# Patient Record
Sex: FEMALE | Race: WHITE | NOT HISPANIC OR LATINO | Employment: FULL TIME | URBAN - METROPOLITAN AREA
[De-identification: names, ages, dates, MRNs, and addresses within clinical notes are randomized per-mention and may not be internally consistent; named-entity substitution may affect disease eponyms.]

---

## 2023-05-08 ENCOUNTER — OFFICE VISIT (OUTPATIENT)
Dept: PRIMARY CARE | Facility: CLINIC | Age: 23
End: 2023-05-08
Payer: COMMERCIAL

## 2023-05-08 VITALS
WEIGHT: 205 LBS | HEART RATE: 93 BPM | SYSTOLIC BLOOD PRESSURE: 121 MMHG | DIASTOLIC BLOOD PRESSURE: 82 MMHG | HEIGHT: 63 IN | BODY MASS INDEX: 36.32 KG/M2 | OXYGEN SATURATION: 98 %

## 2023-05-08 DIAGNOSIS — Z00.00 WELL ADULT EXAM: Primary | ICD-10-CM

## 2023-05-08 DIAGNOSIS — E66.9 OBESITY (BMI 35.0-39.9 WITHOUT COMORBIDITY): ICD-10-CM

## 2023-05-08 PROBLEM — F41.1 GAD (GENERALIZED ANXIETY DISORDER): Status: ACTIVE | Noted: 2022-12-05

## 2023-05-08 PROBLEM — F42.9 OBSESSIVE-COMPULSIVE DISORDER: Status: ACTIVE | Noted: 2022-12-05

## 2023-05-08 PROBLEM — F41.0 PANIC ATTACK: Status: ACTIVE | Noted: 2023-05-08

## 2023-05-08 PROCEDURE — 1036F TOBACCO NON-USER: CPT | Performed by: FAMILY MEDICINE

## 2023-05-08 PROCEDURE — 99395 PREV VISIT EST AGE 18-39: CPT | Performed by: FAMILY MEDICINE

## 2023-05-08 RX ORDER — SERTRALINE HYDROCHLORIDE 100 MG/1
200 TABLET, FILM COATED ORAL DAILY
COMMUNITY

## 2023-05-08 RX ORDER — CLONAZEPAM 0.5 MG/1
0.5 TABLET ORAL 2 TIMES DAILY
COMMUNITY

## 2023-05-08 NOTE — PROGRESS NOTES
"  Subjective   Patient ID: Layla Geiger is a 23 y.o. female who presents for Annual Exam (NP to establish care).    Past Medical, Surgical, Social and Family Hx reviewed-Yes    Any acute complaints?    No    Any chronic issues addressed today or Rx refills needed?    No, reviewed meds    Last pap has not had one yet, has appointment in July  Last Mammogram N/A  Colon CA screening Hx N/A  Labs none recently  Up to date on immunizations not sure about TDaP  Dentist in the past year yes    Menstruation no concerns  Sexual Activity no concerns, no need for birth control        PE:  /82 (BP Location: Left arm)   Pulse 93   Ht 1.6 m (5' 3\")   Wt 93 kg (205 lb)   SpO2 98%   BMI 36.31 kg/m²   Alert adult woman, NAD  HEENT clear  Neck supple, No LAD  Heart RRR no murmur  Lungs CTA bilat  Abdomen benign, normal BS, soft NT/ND  Skin warm, dry, clear  Neuro grossly normal, gait WNL  Affect pleasant and appropriate, memory and judgement WNL      Assessment/Plan   Problem List Items Addressed This Visit    None  Visit Diagnoses       Well adult exam    -  Primary    Obesity (BMI 35.0-39.9 without comorbidity)              Reviewed HM and vaccines  Reviewed BMI and need for wt loss plan  F/U 1 yr or prn       "

## 2023-12-20 ENCOUNTER — OFFICE VISIT (OUTPATIENT)
Dept: DERMATOLOGY | Facility: CLINIC | Age: 23
End: 2023-12-20
Payer: COMMERCIAL

## 2023-12-20 DIAGNOSIS — L70.0 ACNE VULGARIS: Primary | ICD-10-CM

## 2023-12-20 PROCEDURE — 99213 OFFICE O/P EST LOW 20 MIN: CPT | Performed by: DERMATOLOGY

## 2023-12-20 PROCEDURE — 1036F TOBACCO NON-USER: CPT | Performed by: DERMATOLOGY

## 2023-12-20 RX ORDER — TRETINOIN 0.25 MG/G
CREAM TOPICAL
COMMUNITY
Start: 2023-09-20 | End: 2023-12-20

## 2023-12-20 RX ORDER — CLINDAMYCIN PHOSPHATE 10 UG/ML
LOTION TOPICAL
COMMUNITY
Start: 2023-09-20

## 2023-12-20 RX ORDER — BENZOYL PEROXIDE 50 MG/ML
LIQUID TOPICAL
COMMUNITY
Start: 2023-09-14

## 2023-12-20 RX ORDER — TRETINOIN 0.5 MG/G
CREAM TOPICAL
Qty: 45 G | Refills: 2 | Status: SHIPPED | OUTPATIENT
Start: 2023-12-20

## 2023-12-20 ASSESSMENT — DERMATOLOGY PATIENT ASSESSMENT
DO YOU USE SUNSCREEN: DAILY
HAVE YOU HAD OR DO YOU HAVE A STAPH INFECTION: NO
ARE YOU AN ORGAN TRANSPLANT RECIPIENT: NO
ARE YOU ON BIRTH CONTROL: NO
DO YOU HAVE IRREGULAR MENSTRUAL CYCLES: NO
FOR PATIENTS COMING IN FOR A FOLLOW-UP VISIT - HAVE THERE BEEN ANY CHANGES IN YOUR HEALTH SINCE YOUR LAST VISIT: NO
ARE YOU TRYING TO GET PREGNANT: NO
HAVE YOU HAD OR DO YOU HAVE VASCULAR DISEASE: NO
DO YOU USE A TANNING BED: NO
DO YOU HAVE ANY NEW OR CHANGING LESIONS: NO

## 2023-12-20 ASSESSMENT — PATIENT GLOBAL ASSESSMENT (PGA): PATIENT GLOBAL ASSESSMENT: PATIENT GLOBAL ASSESSMENT:  2 - MILD

## 2023-12-20 ASSESSMENT — DERMATOLOGY QUALITY OF LIFE (QOL) ASSESSMENT
ARE THERE EXCLUSIONS OR EXCEPTIONS FOR THE QUALITY OF LIFE ASSESSMENT: NO
DATE THE QUALITY-OF-LIFE ASSESSMENT WAS COMPLETED: 66828
RATE HOW BOTHERED YOU ARE BY EFFECTS OF YOUR SKIN PROBLEMS ON YOUR ACTIVITIES (EG, GOING OUT, ACCOMPLISHING WHAT YOU WANT, WORK ACTIVITIES OR YOUR RELATIONSHIPS WITH OTHERS): 0 - NEVER BOTHERED
RATE HOW EMOTIONALLY BOTHERED YOU ARE BY YOUR SKIN PROBLEM (FOR EXAMPLE, WORRY, EMBARRASSMENT, FRUSTRATION): 4
WHAT SINGLE SKIN CONDITION LISTED BELOW IS THE PATIENT ANSWERING THE QUALITY-OF-LIFE ASSESSMENT QUESTIONS ABOUT: ACNE
RATE HOW BOTHERED YOU ARE BY SYMPTOMS OF YOUR SKIN PROBLEM (EG, ITCHING, STINGING BURNING, HURTING OR SKIN IRRITATION): 6 - ALWAYS BOTHERED

## 2023-12-20 ASSESSMENT — ITCH NUMERIC RATING SCALE: HOW SEVERE IS YOUR ITCHING?: 0

## 2023-12-20 NOTE — PROGRESS NOTES
Subjective     Layla Geiger is a 23 y.o. female who presents for the following: Acne (Face and Back. Currently using BPO, Clindamycin Lotion and Tretinoin 0.025% every day. Pt reports improvement. ).   65% improvement.    Review of Systems:  No other skin or systemic complaints other than what is documented elsewhere in the note.    The following portions of the chart were reviewed this encounter and updated as appropriate:          Skin Cancer History  No skin cancer on file.      Specialty Problems    None       Objective   Well appearing patient in no apparent distress; mood and affect are within normal limits.    A focused skin examination was performed. All findings within normal limits unless otherwise noted below.    Assessment/Plan   1. Acne vulgaris  Head - Anterior (Face)  Few inflammatory papules and open and closed comedonal papules    The chronic and intermittently flaring nature of acne was reviewed with the patient today. Patient advised that acne is multifactorial. Treatment options were reviewed with the patient. Advised that typically takes 2-3 months to see 60-80% improvement and treatments may worsen acne appearance prior to improvement.     Wash face twice daily  Do not spot treat, treat the entire surface area to treat current breakouts and prevent new breakouts    Treatment recommendations:  -discontinue tretinoin 0.025% cream  - Start tretinoin 0.05% cream - every other day pea sized amount to face, increase to daily as tolerated  - continue benzoyl peroxide cleanser and clindamycin 1% lotion daily    Follow up in 3 months    tretinoin (Retin-A) 0.05 % cream - Head - Anterior (Face)  Apply a pea sized amount every other day to whole face, increase to daily as tolerated.    Related Procedures  Follow Up In Dermatology - Established Patient        Follow up in 3 months.

## 2024-03-20 ENCOUNTER — OFFICE VISIT (OUTPATIENT)
Dept: DERMATOLOGY | Facility: CLINIC | Age: 24
End: 2024-03-20
Payer: COMMERCIAL

## 2024-03-20 DIAGNOSIS — L70.0 ACNE VULGARIS: ICD-10-CM

## 2024-03-20 PROCEDURE — 1036F TOBACCO NON-USER: CPT | Performed by: DERMATOLOGY

## 2024-03-20 PROCEDURE — 99213 OFFICE O/P EST LOW 20 MIN: CPT | Performed by: DERMATOLOGY

## 2024-03-20 ASSESSMENT — DERMATOLOGY QUALITY OF LIFE (QOL) ASSESSMENT
RATE HOW BOTHERED YOU ARE BY EFFECTS OF YOUR SKIN PROBLEMS ON YOUR ACTIVITIES (EG, GOING OUT, ACCOMPLISHING WHAT YOU WANT, WORK ACTIVITIES OR YOUR RELATIONSHIPS WITH OTHERS): 0 - NEVER BOTHERED
RATE HOW BOTHERED YOU ARE BY SYMPTOMS OF YOUR SKIN PROBLEM (EG, ITCHING, STINGING BURNING, HURTING OR SKIN IRRITATION): 1
ARE THERE EXCLUSIONS OR EXCEPTIONS FOR THE QUALITY OF LIFE ASSESSMENT: NO
RATE HOW EMOTIONALLY BOTHERED YOU ARE BY YOUR SKIN PROBLEM (FOR EXAMPLE, WORRY, EMBARRASSMENT, FRUSTRATION): 0 - NEVER BOTHERED
WHAT SINGLE SKIN CONDITION LISTED BELOW IS THE PATIENT ANSWERING THE QUALITY-OF-LIFE ASSESSMENT QUESTIONS ABOUT: ACNE

## 2024-03-20 ASSESSMENT — DERMATOLOGY PATIENT ASSESSMENT
ARE YOU TRYING TO GET PREGNANT: NO
ARE YOU AN ORGAN TRANSPLANT RECIPIENT: NO
DO YOU USE A TANNING BED: NO
DO YOU HAVE ANY NEW OR CHANGING LESIONS: NO
ARE YOU ON BIRTH CONTROL: NO
DO YOU USE SUNSCREEN: OCCASIONALLY
FOR PATIENTS COMING IN FOR A FOLLOW-UP VISIT - HAVE THERE BEEN ANY CHANGES IN YOUR HEALTH SINCE YOUR LAST VISIT: NO

## 2024-03-20 ASSESSMENT — ITCH NUMERIC RATING SCALE: HOW SEVERE IS YOUR ITCHING?: 0

## 2024-03-20 ASSESSMENT — PATIENT GLOBAL ASSESSMENT (PGA): PATIENT GLOBAL ASSESSMENT: PATIENT GLOBAL ASSESSMENT:  2 - MILD

## 2024-03-20 NOTE — PROGRESS NOTES
Subjective     Layla Geiger is a 24 y.o. female who presents for the following: Acne (Pt here for 3 month acne follow up. Acne is located on the face. Current tx-BPO 5% wash daily, Clindamycin 1% lotion, Tretinoin 0.05% cream every night. Pt feel acne has improved since LV. ).     Review of Systems:  No other skin or systemic complaints other than what is documented elsewhere in the note.    The following portions of the chart were reviewed this encounter and updated as appropriate:          Skin Cancer History  No skin cancer on file.      Specialty Problems    None       Objective   Well appearing patient in no apparent distress; mood and affect are within normal limits.    A focused skin examination was performed. All findings within normal limits unless otherwise noted below.    Assessment/Plan   1. Acne vulgaris  Head - Anterior (Face)  Few scattered comedones, otherwise clear    The chronic and intermittently flaring nature of acne was reviewed with the patient today. Patient advised that acne is multifactorial. Treatment options were reviewed with the patient. Advised that typically takes 2-3 months to see 60-80% improvement and treatments may worsen acne appearance prior to improvement.     She has improved about 80% with current regimen, happy with results and tolerating well.    Wash face twice daily  Do not spot treat, treat the entire surface area to treat current breakouts and prevent new breakouts    Improved and stable on current treatment    Treatment recommendations:  - Continue tretinoin 0.05% cream - every other day pea sized amount to face, increase to daily as tolerated  - Continue benzoyl peroxide cleanser and clindamycin 1% lotion daily    Follow up in 1 year or sooner if flaring and not responding to above regimen    Related Procedures  Follow Up In Dermatology - Established Patient  Follow Up In Dermatology - Established Patient    Related Medications  tretinoin (Retin-A) 0.05 %  cream  Apply a pea sized amount every other day to whole face, increase to daily as tolerated.    Follow up in 1 year or sooner as needed    Luigi Whitley MD   PGY3, Department of Dermatology    I saw and evaluated the patient. I personally obtained the key and critical portions of the history and physical exam or was physically present for key and critical portions performed by the resident/fellow. I reviewed the resident/fellow's documentation and discussed the patient with the resident/fellow. I agree with the resident/fellow's medical decision making as documented in the note.    Renate Keller, DO

## 2024-09-12 DIAGNOSIS — L70.0 ACNE VULGARIS: ICD-10-CM

## 2024-09-12 RX ORDER — TRETINOIN 0.5 MG/G
CREAM TOPICAL
Qty: 45 G | Refills: 2 | Status: SHIPPED | OUTPATIENT
Start: 2024-09-12

## 2024-10-22 ENCOUNTER — OFFICE VISIT (OUTPATIENT)
Dept: URGENT CARE | Age: 24
End: 2024-10-22
Payer: COMMERCIAL

## 2024-10-22 VITALS
HEART RATE: 72 BPM | DIASTOLIC BLOOD PRESSURE: 79 MMHG | TEMPERATURE: 98.1 F | SYSTOLIC BLOOD PRESSURE: 122 MMHG | OXYGEN SATURATION: 98 % | RESPIRATION RATE: 16 BRPM

## 2024-10-22 DIAGNOSIS — R05.9 COUGH, UNSPECIFIED TYPE: ICD-10-CM

## 2024-10-22 DIAGNOSIS — Z53.21 PATIENT LEFT WITHOUT BEING SEEN: Primary | ICD-10-CM

## 2024-10-22 LAB — POC SARS-COV-2 AG BINAX: NORMAL

## 2024-10-23 NOTE — PROGRESS NOTES
Subjective   Patient ID: Layla Geiger is a 24 y.o. female. They present today with a chief complaint of URI and Cough (X 3 days).    History of Present Illness  HPI    Past Medical History  Allergies as of 10/22/2024    (No Known Allergies)       (Not in a hospital admission)       Past Medical History:   Diagnosis Date    Sprain of anterior cruciate ligament of right knee, initial encounter     Right ACL tear       Past Surgical History:   Procedure Laterality Date    OTHER SURGICAL HISTORY  07/29/2022    Anterior cruciate ligament repair        reports that she has never smoked. She has never used smokeless tobacco. She reports current alcohol use of about 2.0 standard drinks of alcohol per week. She reports current drug use. Drug: Marijuana.    Review of Systems  Review of Systems                               Objective    Vitals:    10/22/24 1851   BP: 122/79   BP Location: Left arm   Patient Position: Sitting   Pulse: 72   Resp: 16   Temp: 36.7 °C (98.1 °F)   SpO2: 98%     No LMP recorded.    Physical Exam    Procedures    Point of Care Test & Imaging Results from this visit  Results for orders placed or performed in visit on 10/22/24   POCT Covid-19 Rapid Antigen   Result Value Ref Range    POC NASIMA-COV-2 AG  Presumptive negative test for SARS-CoV-2 (no antigen detected)     Presumptive negative test for SARS-CoV-2 (no antigen detected)      No results found.    Diagnostic study results (if any) were reviewed by Viri Uribe.    Assessment/Plan   Allergies, medications, history, and pertinent labs/EKGs/Imaging reviewed by Viri Uribe.     Medical Decision Making      Orders and Diagnoses  Diagnoses and all orders for this visit:  Patient left without being seen  Cough, unspecified type  -     POCT Covid-19 Rapid Antigen      Medical Admin Record  Patient left without being seen    Patient disposition: Home    Electronically signed by Viri Uribe  9:50 PM

## 2024-10-25 ENCOUNTER — OFFICE VISIT (OUTPATIENT)
Dept: URGENT CARE | Age: 24
End: 2024-10-25
Payer: COMMERCIAL

## 2024-10-25 VITALS
RESPIRATION RATE: 16 BRPM | OXYGEN SATURATION: 97 % | TEMPERATURE: 97.7 F | HEART RATE: 71 BPM | SYSTOLIC BLOOD PRESSURE: 123 MMHG | DIASTOLIC BLOOD PRESSURE: 78 MMHG

## 2024-10-25 DIAGNOSIS — J20.9 ACUTE BRONCHITIS, UNSPECIFIED ORGANISM: Primary | ICD-10-CM

## 2024-10-25 RX ORDER — PREDNISONE 20 MG/1
20 TABLET ORAL DAILY
Qty: 5 TABLET | Refills: 0 | Status: SHIPPED | OUTPATIENT
Start: 2024-10-25 | End: 2024-10-30

## 2024-10-25 RX ORDER — FLUTICASONE PROPIONATE 50 MCG
1 SPRAY, SUSPENSION (ML) NASAL DAILY
Qty: 16 G | Refills: 0 | Status: SHIPPED | OUTPATIENT
Start: 2024-10-25 | End: 2024-11-04

## 2024-10-25 RX ORDER — BENZONATATE 100 MG/1
100 CAPSULE ORAL 3 TIMES DAILY PRN
Qty: 20 CAPSULE | Refills: 0 | Status: SHIPPED | OUTPATIENT
Start: 2024-10-25 | End: 2024-11-01

## 2024-10-25 ASSESSMENT — ENCOUNTER SYMPTOMS
DIZZINESS: 0
SORE THROAT: 1
FATIGUE: 0
SINUS PRESSURE: 0
SHORTNESS OF BREATH: 1
COUGH: 1
HEADACHES: 0

## 2024-10-25 NOTE — PATIENT INSTRUCTIONS
You have been diagnosed with bronchitis, I recommend treatment with prednisone, Tessalon Perle.  Please take Mucinex, DayQuil as needed.  I recommend vaping cessation.  If you are experiencing new or worsening symptoms please return for evaluation.  No antibiotics are indicated at this visit.

## 2024-10-25 NOTE — PROGRESS NOTES
Subjective   Patient ID: Layla Geiger is a 24 y.o. female. They present today with a chief complaint of Cough (Burning in chest, and hard to take deep breathes x 7 days) and URI.    History of Present Illness  24-year-old, with hx of vaping with productive cough, yellow mucus, burning pain in the chest and hard to take deep breath, sore throat, currently using DayQuil and Mucinex.  Exposed to girlfriend with bronchitis.  She denies personal history of asthma or bronchitis.  No fever or chills.      Cough  Associated symptoms include chest pain, a sore throat and shortness of breath. Pertinent negatives include no headaches.   URI  Presenting symptoms: congestion, cough and sore throat    Presenting symptoms: no fatigue    Associated symptoms: no headaches        Past Medical History  Allergies as of 10/25/2024    (No Known Allergies)       (Not in a hospital admission)       Past Medical History:   Diagnosis Date    Sprain of anterior cruciate ligament of right knee, initial encounter     Right ACL tear       Past Surgical History:   Procedure Laterality Date    OTHER SURGICAL HISTORY  07/29/2022    Anterior cruciate ligament repair        reports that she has never smoked. She has never used smokeless tobacco. She reports current alcohol use of about 2.0 standard drinks of alcohol per week. She reports current drug use. Drug: Marijuana.    Review of Systems  Review of Systems   Constitutional:  Negative for fatigue.   HENT:  Positive for congestion and sore throat. Negative for sinus pressure.    Respiratory:  Positive for cough and shortness of breath.    Cardiovascular:  Positive for chest pain.   Neurological:  Negative for dizziness and headaches.                                  Objective    Vitals:    10/25/24 1042   BP: 123/78   BP Location: Left arm   Patient Position: Sitting   Pulse: 71   Resp: 16   Temp: 36.5 °C (97.7 °F)   SpO2: 97%     No LMP recorded.    Physical Exam  Vitals and nursing note  reviewed.   Constitutional:       General: She is not in acute distress.     Appearance: Normal appearance. She is not ill-appearing, toxic-appearing or diaphoretic.   HENT:      Nose: Congestion present.      Mouth/Throat:      Pharynx: Posterior oropharyngeal erythema (Cryptic +2) present.   Cardiovascular:      Rate and Rhythm: Normal rate and regular rhythm.      Pulses: Normal pulses.      Heart sounds: Normal heart sounds.   Pulmonary:      Effort: Pulmonary effort is normal. No respiratory distress.      Breath sounds: Rhonchi present. No wheezing or rales.   Lymphadenopathy:      Cervical: No cervical adenopathy.   Neurological:      General: No focal deficit present.      Mental Status: She is alert and oriented to person, place, and time.   Psychiatric:         Mood and Affect: Mood normal.         Behavior: Behavior normal.         Procedures    Point of Care Test & Imaging Results from this visit  No results found for this visit on 10/25/24.   No results found.    Diagnostic study results (if any) were reviewed by [facility].    Assessment/Plan   Allergies, medications, history, and pertinent labs/EKGs/Imaging reviewed by Alley Fischer.     Medical Decision Making  You have been diagnosed with bronchitis, I recommend treatment with prednisone, Tessalon Perle.  Please take Mucinex, DayQuil as needed.  I recommend vaping cessation.  If you are experiencing new or worsening symptoms please return for evaluation.  No antibiotics are indicated at this visit.    Orders and Diagnoses  Diagnoses and all orders for this visit:  Acute bronchitis, unspecified organism  -     predniSONE (Deltasone) 20 mg tablet; Take 1 tablet (20 mg) by mouth once daily for 5 days.  -     benzonatate (Tessalon Perles) 100 mg capsule; Take 1 capsule (100 mg) by mouth 3 times a day as needed for cough for up to 7 days. Do not crush or chew.  -     fluticasone (Flonase Allergy Relief) 50 mcg/actuation nasal spray;  Administer 1 spray into each nostril once daily for 10 days. Shake gently. Before first use, prime pump. After use, clean tip and replace cap.      Medical Admin Record      Patient disposition: Home okay so she is ready    Electronically signed by [facility]  11:11 AM

## 2024-10-28 DIAGNOSIS — L70.0 ACNE VULGARIS: Primary | ICD-10-CM

## 2024-10-29 RX ORDER — CLINDAMYCIN PHOSPHATE 10 UG/ML
LOTION TOPICAL
Qty: 60 ML | Refills: 4 | Status: SHIPPED | OUTPATIENT
Start: 2024-10-29

## 2025-03-26 ENCOUNTER — APPOINTMENT (OUTPATIENT)
Dept: DERMATOLOGY | Facility: CLINIC | Age: 25
End: 2025-03-26
Payer: COMMERCIAL

## 2025-03-26 DIAGNOSIS — L70.0 ACNE VULGARIS: Primary | ICD-10-CM

## 2025-03-26 PROCEDURE — 99214 OFFICE O/P EST MOD 30 MIN: CPT | Performed by: DERMATOLOGY

## 2025-03-26 RX ORDER — TRETINOIN 0.5 MG/G
CREAM TOPICAL
Qty: 45 G | Refills: 2 | Status: SHIPPED | OUTPATIENT
Start: 2025-03-26

## 2025-03-26 RX ORDER — BENZOYL PEROXIDE 50 MG/ML
LIQUID TOPICAL
Qty: 148 ML | Refills: 11 | Status: SHIPPED | OUTPATIENT
Start: 2025-03-26

## 2025-03-26 RX ORDER — CLINDAMYCIN PHOSPHATE 10 UG/ML
LOTION TOPICAL
Qty: 60 ML | Refills: 11 | Status: SHIPPED | OUTPATIENT
Start: 2025-03-26

## 2025-03-26 RX ORDER — SPIRONOLACTONE 50 MG/1
TABLET, FILM COATED ORAL
Qty: 60 TABLET | Refills: 2 | Status: SHIPPED | OUTPATIENT
Start: 2025-03-26

## 2025-03-26 NOTE — PROGRESS NOTES
Subjective     Layla Geiger is a 25 y.o. female who presents for the following: Acne (Annual - LV 03/20/24 - Patients acne vulgaris has improved since using Tretinoin 0.05%, BP Cleanser, and Clindamycin 1% lotion.  Area(s) of concern: face). Flaring on jawline. Does get a period regularly, not planning pregnancy    Review of Systems:  No other skin or systemic complaints other than what is documented elsewhere in the note.    The following portions of the chart were reviewed this encounter and updated as appropriate:          Skin Cancer History  No skin cancer on file.      Specialty Problems    None       Objective   Well appearing patient in no apparent distress; mood and affect are within normal limits.    A focused skin examination was performed of the face, jawline. All findings within normal limits unless otherwise noted below.    Assessment/Plan   1. Acne vulgaris  Head - Anterior (Face)  Cystic papules on jawline, scattered inflammatory papules on cheeks    The chronic and intermittently flaring nature of acne was reviewed with the patient today. Patient advised that acne is multifactorial. Treatment options were reviewed with the patient. Advised that typically takes 2-3 months to see 60-80% improvement and treatments may worsen acne appearance prior to improvement.     Previously improved, more recently flaring on jawline.    Wash face twice daily  Do not spot treat, treat the entire surface area to treat current breakouts and prevent new breakouts    Improved and stable on current treatment    Treatment recommendations:  - Continue clindamycin 1% lotion daily to whole face  - Continue tretinoin 0.05% cream - every other day pea sized amount to face, increase to daily as tolerated  - Continue benzoyl peroxide cleanser and clindamycin 1% lotion daily    - Due to current flare of cysts on jawline start spironolactone 50mg daily x 2 weeks then increase to 100mg once daily thereafter. Side effects of  spironolactone were reviewed including increased potassium levels, gynecomastia, breast tenderness, abnormalities in menstrual cycle, birth defects.  Studies have shown there is minimal risk with use in patients with a history of hormonally driving malignancies, such as malignancies of the breast.    Follow up in 3-4 months       spironolactone (Aldactone) 50 mg tablet - Head - Anterior (Face)  Take 1 tablet (50mg) by mouth once daily. After 2 weeks, try to increase to 2 tablets (100 mg) by mouth once daily.    Related Procedures  Follow Up In Dermatology - Established Patient  Follow Up In Dermatology - Established Patient    Related Medications  tretinoin (Retin-A) 0.05 % cream  Apply a pea sized amount every other day to whole face, increase to daily as tolerated.    clindamycin (Cleocin T) 1 % lotion  Apply to face daily in morning    benzoyl peroxide 5 % external wash  Lather onto face for 1-2 minutes then rinse once daily

## 2025-06-25 ENCOUNTER — APPOINTMENT (OUTPATIENT)
Dept: DERMATOLOGY | Facility: CLINIC | Age: 25
End: 2025-06-25
Payer: COMMERCIAL

## 2025-08-15 DIAGNOSIS — L70.0 ACNE VULGARIS: ICD-10-CM

## 2025-08-15 RX ORDER — SPIRONOLACTONE 50 MG/1
TABLET, FILM COATED ORAL
Qty: 60 TABLET | Refills: 2 | OUTPATIENT
Start: 2025-08-15

## 2025-08-27 ENCOUNTER — APPOINTMENT (OUTPATIENT)
Dept: DERMATOLOGY | Facility: CLINIC | Age: 25
End: 2025-08-27
Payer: COMMERCIAL

## 2025-08-27 DIAGNOSIS — Z79.899 HIGH RISK MEDICATION USE: ICD-10-CM

## 2025-08-27 DIAGNOSIS — L70.0 ACNE VULGARIS: Primary | ICD-10-CM

## 2025-08-27 PROCEDURE — 99214 OFFICE O/P EST MOD 30 MIN: CPT | Performed by: DERMATOLOGY

## 2025-08-27 PROCEDURE — 1036F TOBACCO NON-USER: CPT | Performed by: DERMATOLOGY

## 2025-08-27 RX ORDER — NORGESTIMATE AND ETHINYL ESTRADIOL 7DAYSX3 LO
1 KIT ORAL DAILY
Qty: 28 TABLET | Refills: 6 | Status: SHIPPED | OUTPATIENT
Start: 2025-08-27 | End: 2026-08-27

## 2025-08-27 ASSESSMENT — ITCH NUMERIC RATING SCALE: HOW SEVERE IS YOUR ITCHING?: 0

## 2025-08-27 ASSESSMENT — DERMATOLOGY PATIENT ASSESSMENT
DO YOU HAVE ANY NEW OR CHANGING LESIONS: NO
ARE YOU TRYING TO GET PREGNANT: NO
HAVE YOU HAD OR DO YOU HAVE A STAPH INFECTION: NO
DO YOU USE SUNSCREEN: OCCASIONALLY
FOR PATIENTS COMING IN FOR A FOLLOW-UP VISIT - HAVE THERE BEEN ANY CHANGES IN YOUR HEALTH SINCE YOUR LAST VISIT: ALL IN MYCHART
ARE YOU AN ORGAN TRANSPLANT RECIPIENT: NO
ARE YOU ON BIRTH CONTROL: NO
HAVE YOU HAD OR DO YOU HAVE VASCULAR DISEASE: NO
DO YOU USE A TANNING BED: NO

## 2025-08-27 ASSESSMENT — DERMATOLOGY QUALITY OF LIFE (QOL) ASSESSMENT
ARE THERE EXCLUSIONS OR EXCEPTIONS FOR THE QUALITY OF LIFE ASSESSMENT: NO
RATE HOW EMOTIONALLY BOTHERED YOU ARE BY YOUR SKIN PROBLEM (FOR EXAMPLE, WORRY, EMBARRASSMENT, FRUSTRATION): 2
RATE HOW BOTHERED YOU ARE BY SYMPTOMS OF YOUR SKIN PROBLEM (EG, ITCHING, STINGING BURNING, HURTING OR SKIN IRRITATION): 0 - NEVER BOTHERED
RATE HOW BOTHERED YOU ARE BY EFFECTS OF YOUR SKIN PROBLEMS ON YOUR ACTIVITIES (EG, GOING OUT, ACCOMPLISHING WHAT YOU WANT, WORK ACTIVITIES OR YOUR RELATIONSHIPS WITH OTHERS): 1
WHAT SINGLE SKIN CONDITION LISTED BELOW IS THE PATIENT ANSWERING THE QUALITY-OF-LIFE ASSESSMENT QUESTIONS ABOUT: ACNE

## 2025-08-27 ASSESSMENT — PATIENT GLOBAL ASSESSMENT (PGA): PATIENT GLOBAL ASSESSMENT: PATIENT GLOBAL ASSESSMENT:  2 - MILD

## 2025-10-17 ENCOUNTER — APPOINTMENT (OUTPATIENT)
Dept: DERMATOLOGY | Facility: CLINIC | Age: 25
End: 2025-10-17
Payer: COMMERCIAL

## 2025-12-10 ENCOUNTER — APPOINTMENT (OUTPATIENT)
Dept: DERMATOLOGY | Facility: CLINIC | Age: 25
End: 2025-12-10
Payer: COMMERCIAL